# Patient Record
(demographics unavailable — no encounter records)

---

## 2025-05-06 NOTE — PHYSICAL EXAM
[FreeTextEntry1] : Examination today reveals a level pelvis symmetric leg lengths and a normal gait without limp.  Inspection of the spine reveals no evidence of overlying skin changes to suggest spinal dysraphism.  She has excellent motion to the entire spine in all planes.  No points of paravertebral muscle spasm or tenderness present.  Forward bend test revealed a right thoracic curve with moderate structural rotation the plumbline is compensated.  Both lower extremities are symmetric in appearance without atrophy and move well at all individual joints.  Straight leg raising is negative she is neurologically intact.  Scoliosis AP and lateral x-rays ordered and taken today reviewed interpreted by myself reveal idiopathic right thoracic curve of 14 degrees as measured from T5-T12.  No congenital features noted she is still immature

## 2025-05-06 NOTE — ASSESSMENT
[FreeTextEntry1] : Impression: Adolescent scoliosis.  Family has been made aware as to the above along with the potential for progression of the curve as she is still skeletally immature.  For the present she will be treated by observation and will return in 5 months

## 2025-05-06 NOTE — CONSULT LETTER
[Dear  ___] : Dear  [unfilled], [Consult Letter:] : I had the pleasure of evaluating your patient, [unfilled]. [Please see my note below.] : Please see my note below. [Consult Closing:] : Thank you very much for allowing me to participate in the care of this patient.  If you have any questions, please do not hesitate to contact me. [Sincerely,] : Sincerely, [FreeTextEntry3] : Dr Poli Thomas JR.

## 2025-05-06 NOTE — HISTORY OF PRESENT ILLNESS
[FreeTextEntry1] : This 13+ 1-year-old healthy child with normal development is seen today for evaluation of the spine.  She has been recently noted by the pediatrician on routine examination to have questionable spinal deformity.  This child has been asymptomatic without complaint and fully functional in all activities propria for her age.  Her menarche was at age 12 she is not regular as yet.  Past medical history and family history is noncontributory